# Patient Record
Sex: MALE | Race: WHITE | Employment: OTHER | URBAN - METROPOLITAN AREA
[De-identification: names, ages, dates, MRNs, and addresses within clinical notes are randomized per-mention and may not be internally consistent; named-entity substitution may affect disease eponyms.]

---

## 2023-01-22 ENCOUNTER — HOSPITAL ENCOUNTER (EMERGENCY)
Facility: CLINIC | Age: 73
Discharge: HOME | End: 2023-01-22
Attending: EMERGENCY MEDICINE
Payer: OTHER GOVERNMENT

## 2023-01-22 VITALS
OXYGEN SATURATION: 96 % | SYSTOLIC BLOOD PRESSURE: 172 MMHG | HEIGHT: 69 IN | WEIGHT: 230 LBS | TEMPERATURE: 97.6 F | BODY MASS INDEX: 34.07 KG/M2 | HEART RATE: 106 BPM | DIASTOLIC BLOOD PRESSURE: 88 MMHG | RESPIRATION RATE: 18 BRPM

## 2023-01-22 DIAGNOSIS — R33.9 URINARY RETENTION: ICD-10-CM

## 2023-01-22 DIAGNOSIS — R33.8 ACUTE URINARY RETENTION: Primary | ICD-10-CM

## 2023-01-22 LAB
ABSOLUTE EOS #: 0.2 K/UL (ref 0–0.4)
ABSOLUTE LYMPH #: 1.3 K/UL (ref 1–4.8)
ABSOLUTE MONO #: 0.6 K/UL (ref 0.1–1.2)
ANION GAP SERPL CALCULATED.3IONS-SCNC: 11 MMOL/L (ref 9–17)
BACTERIA: ABNORMAL
BASOPHILS # BLD: 1 % (ref 0–2)
BASOPHILS ABSOLUTE: 0 K/UL (ref 0–0.2)
BILIRUBIN URINE: NEGATIVE
BUN BLDV-MCNC: 22 MG/DL (ref 8–23)
CALCIUM SERPL-MCNC: 9.5 MG/DL (ref 8.6–10.4)
CHLORIDE BLD-SCNC: 99 MMOL/L (ref 98–107)
CO2: 28 MMOL/L (ref 20–31)
COLOR: YELLOW
CREAT SERPL-MCNC: 0.9 MG/DL (ref 0.7–1.2)
EOSINOPHILS RELATIVE PERCENT: 2 % (ref 1–4)
EPITHELIAL CELLS UA: ABNORMAL /HPF (ref 0–5)
GFR SERPL CREATININE-BSD FRML MDRD: >60 ML/MIN/1.73M2
GLUCOSE BLD-MCNC: 246 MG/DL (ref 70–99)
GLUCOSE URINE: ABNORMAL
HCT VFR BLD CALC: 49.8 % (ref 41–53)
HEMOGLOBIN: 16 G/DL (ref 13.5–17.5)
KETONES, URINE: NEGATIVE
LEUKOCYTE ESTERASE, URINE: NEGATIVE
LYMPHOCYTES # BLD: 18 % (ref 24–44)
MCH RBC QN AUTO: 26.5 PG (ref 26–34)
MCHC RBC AUTO-ENTMCNC: 32.1 G/DL (ref 31–37)
MCV RBC AUTO: 82.4 FL (ref 80–100)
MONOCYTES # BLD: 8 % (ref 2–11)
NITRITE, URINE: NEGATIVE
OTHER OBSERVATIONS UA: ABNORMAL
PDW BLD-RTO: 16.7 % (ref 12.5–15.4)
PH UA: 6 (ref 5–8)
PLATELET # BLD: 210 K/UL (ref 140–450)
PMV BLD AUTO: 7.4 FL (ref 6–12)
POTASSIUM SERPL-SCNC: 4.3 MMOL/L (ref 3.7–5.3)
PROTEIN UA: ABNORMAL
RBC # BLD: 6.04 M/UL (ref 4.5–5.9)
RBC UA: ABNORMAL /HPF (ref 0–2)
SEG NEUTROPHILS: 71 % (ref 36–66)
SEGMENTED NEUTROPHILS ABSOLUTE COUNT: 5 K/UL (ref 1.8–7.7)
SODIUM BLD-SCNC: 138 MMOL/L (ref 135–144)
SPECIFIC GRAVITY UA: 1.02 (ref 1–1.03)
TURBIDITY: ABNORMAL
URINE HGB: ABNORMAL
UROBILINOGEN, URINE: NORMAL
WBC # BLD: 7.1 K/UL (ref 3.5–11)
WBC UA: ABNORMAL /HPF (ref 0–5)

## 2023-01-22 PROCEDURE — 99283 EMERGENCY DEPT VISIT LOW MDM: CPT

## 2023-01-22 PROCEDURE — 81001 URINALYSIS AUTO W/SCOPE: CPT

## 2023-01-22 PROCEDURE — 85025 COMPLETE CBC W/AUTO DIFF WBC: CPT

## 2023-01-22 PROCEDURE — 80048 BASIC METABOLIC PNL TOTAL CA: CPT

## 2023-01-22 PROCEDURE — 36415 COLL VENOUS BLD VENIPUNCTURE: CPT

## 2023-01-22 PROCEDURE — 6370000000 HC RX 637 (ALT 250 FOR IP): Performed by: NURSE PRACTITIONER

## 2023-01-22 RX ORDER — LIDOCAINE HYDROCHLORIDE 20 MG/ML
JELLY TOPICAL ONCE
Status: COMPLETED | OUTPATIENT
Start: 2023-01-22 | End: 2023-01-22

## 2023-01-22 RX ADMIN — LIDOCAINE HYDROCHLORIDE: 20 JELLY TOPICAL at 21:19

## 2023-01-22 ASSESSMENT — PAIN DESCRIPTION - ONSET: ONSET: ON-GOING

## 2023-01-22 ASSESSMENT — ENCOUNTER SYMPTOMS
ABDOMINAL DISTENTION: 1
ABDOMINAL PAIN: 1

## 2023-01-22 ASSESSMENT — PAIN DESCRIPTION - FREQUENCY: FREQUENCY: CONTINUOUS

## 2023-01-22 ASSESSMENT — PAIN DESCRIPTION - DESCRIPTORS: DESCRIPTORS: ACHING

## 2023-01-22 ASSESSMENT — PAIN DESCRIPTION - PAIN TYPE: TYPE: ACUTE PAIN

## 2023-01-22 ASSESSMENT — PAIN SCALES - GENERAL: PAINLEVEL_OUTOF10: 8

## 2023-01-22 ASSESSMENT — PAIN - FUNCTIONAL ASSESSMENT
PAIN_FUNCTIONAL_ASSESSMENT: 0-10
PAIN_FUNCTIONAL_ASSESSMENT: ACTIVITIES ARE NOT PREVENTED

## 2023-01-22 ASSESSMENT — PAIN DESCRIPTION - LOCATION: LOCATION: ABDOMEN

## 2023-01-23 ENCOUNTER — HOSPITAL ENCOUNTER (EMERGENCY)
Facility: CLINIC | Age: 73
Discharge: HOME | End: 2023-01-23
Attending: EMERGENCY MEDICINE
Payer: OTHER GOVERNMENT

## 2023-01-23 VITALS
TEMPERATURE: 97.8 F | HEART RATE: 99 BPM | BODY MASS INDEX: 34.07 KG/M2 | DIASTOLIC BLOOD PRESSURE: 121 MMHG | RESPIRATION RATE: 16 BRPM | WEIGHT: 230 LBS | SYSTOLIC BLOOD PRESSURE: 142 MMHG | HEIGHT: 69 IN | OXYGEN SATURATION: 91 %

## 2023-01-23 DIAGNOSIS — T83.9XXA PROBLEM WITH FOLEY CATHETER, INITIAL ENCOUNTER (HCC): Primary | ICD-10-CM

## 2023-01-23 PROCEDURE — 51702 INSERT TEMP BLADDER CATH: CPT

## 2023-01-23 PROCEDURE — 99282 EMERGENCY DEPT VISIT SF MDM: CPT

## 2023-01-23 ASSESSMENT — PAIN - FUNCTIONAL ASSESSMENT: PAIN_FUNCTIONAL_ASSESSMENT: NONE - DENIES PAIN

## 2023-01-23 NOTE — ED NOTES
Patient placed on 4L of [place] O2 which he wears at home  O2 sat of [facility]  01/22/23 2103

## 2023-01-23 NOTE — DISCHARGE INSTRUCTIONS
Continue to monitor Franks output. See your urologist as scheduled. Return for pain, fever, blood in urine, decreased drainage, or if worse in any way. Please understand that at this time there is no evidence for a more serious underlying process, but that early in the process of an illness or injury, an emergency department workup can be falsely reassuring. You should contact your family doctor within the next 48 hours for a follow up appointment    Melaniekrisduglas Saunders!!!    From [facility] ([facility]) and [facility] Emergency Services    On behalf of the Emergency Department staff at [facility]), I would like to thank you for giving us the opportunity to address your health care needs and concerns. We hope that during your visit, our service was delivered in a professional and caring manner. Please keep [facility] ([facility]) in mind as we walk with you down the path to your own personal wellness. Please expect an automated text message or email from us so we can ask a few questions about your health and progress. Based on your answers, a clinician may call you back to offer help and instructions. Please understand that early in the process of an illness or injury, an emergency department workup can be falsely reassuring. If you notice any worsening, changing or persistent symptoms please call your family doctor or return to the ER immediately. Tell us how we did during your visit at [email or web address]   and let us know about your experience

## 2023-01-23 NOTE — ED PROVIDER NOTES
[address] ED  EMERGENCY DEPARTMENT ENCOUNTER      Pt Name: Shena Enrique  MRN: [id number]  [place] 1950  Date of evaluation: 1/22/2023  Provider: GARETT Rodriguez [id number]       Chief Complaint   Patient presents with    Urinary Retention    Penis Pain         HISTORY OF PRESENT ILLNESS   (Location/Symptom, Timing/Onset, Context/Setting, Quality, Duration, Modifying Factors, Severity)  Note limiting factors. Shena Enrique is a 81 y.o. male who presents to the emergency department evaluation of urinary retention. Patient states that yesterday he seemed to be urinating fine. This morning he woke up and has only been dribbling. He reports significant discomfort in his abdominal region and has burning in the tip of the penis. Patient denies any history of kidney stones denies any hematuria or blood in the urine but reports that it is only been able to void small amounts since this morning. He has been out of his \"prostate medication\" for about a week. Patient has a history of prostate cancer about 15 years ago. He states that his prostate was not removed it was just \"scraped\" he sees a urologist at the [place] but has not for some time. He denies any chest pain or new shortness of breath. Patient has COPD and is on chronic oxygen therapy he continues to smoke. Patient denies any fevers chills nausea vomiting. He denies any flank pain        Nursing Notes were reviewed. REVIEW OF SYSTEMS    (2-9 systems for level 4, 10 or more for level 5)     Review of Systems   Gastrointestinal:  Positive for abdominal distention and abdominal pain. Genitourinary:  Positive for decreased urine volume and penile pain. All other systems reviewed and are negative. Except as noted above the remainder of the review of systems was reviewed and negative.        PAST MEDICAL HISTORY     Past Medical History:   Diagnosis Date    Arthritis     Cerebral artery occlusion with cerebral infarction [facility])     CHF (congestive heart failure) (HCC)     COPD (chronic obstructive pulmonary disease) (HCC)     Diabetes mellitus (HCC)     Hyperlipidemia     Seizures (HCC)     Sleep apnea          SURGICAL HISTORY     History reviewed. No pertinent surgical history. CURRENT MEDICATIONS       Discharge Medication List as of 1/22/2023 10:27 PM        CONTINUE these medications which have NOT CHANGED    Details   predniSONE (DELTASONE) 10 MG tablet Take 4 tablets for 4 days, then Take 3 tablets for 4 days, then Take 2 tablets for 4 days, then Take 1 tablet for 4 days, then stop, Disp-40 tablet, R-0      metFORMIN (GLUCOPHAGE) 500 MG tablet Take 1,000 mg by mouth 2 times daily (with meals)       aspirin 325 MG tablet Take 325 mg by mouth daily      albuterol-ipratropium (COMBIVENT)  MCG/ACT inhaler Inhale 1 puff into the lungs 4 times daily      atorvastatin (LIPITOR) 40 MG tablet Take 40 mg by mouth daily      furosemide (LASIX) 40 MG tablet Take 40 mg by mouth 2 times daily      glyBURIDE (DIABETA) 5 MG tablet Take 5 mg by mouth 2 times daily (with meals)      levETIRAcetam (KEPPRA) 750 MG tablet Take 750 mg by mouth 2 times daily      lisinopril (PRINIVIL;ZESTRIL) 40 MG tablet Take 40 mg by mouth daily             ALLERGIES     Patient has no known allergies. FAMILY HISTORY     History reviewed. No pertinent family history.        SOCIAL HISTORY       Social History     Socioeconomic History    Marital status:      Spouse name: None    Number of children: None    Years of education: None    Highest education level: None   Tobacco Use    Smoking status: Every Day   Substance and Sexual Activity    Alcohol use: No       SCREENINGS         [place] Coma Scale  Eye Opening: Spontaneous  Best Verbal Response: Oriented  Best Motor Response: Obeys commands  [place] Coma Scale Score: 15                     CIWA Assessment  BP: (!) 172/88  Heart Rate: (!) 106                 PHYSICAL EXAM    (up to 7 for level 4, 8 or more for level 5)     ED Triage Vitals [01/22/23 2055]   BP Temp Temp Source Heart Rate Resp SpO2 Height Weight   (!) 172/88 97.6 °F (36.4 °C) Oral (!) 106 16 90 % 5' 9\" (1.753 m) 230 lb (104.3 kg)       Physical Exam  Vitals and nursing note reviewed. Constitutional:       General: He is not in acute distress. Appearance: Normal appearance. He is obese. He is not ill-appearing or toxic-appearing. HENT:      Head: Normocephalic and atraumatic. Right Ear: External ear normal.      Left Ear: External ear normal.      Ears:      Comments: Patient is significantly hard of hearing     Nose: Nose normal. No congestion. Mouth/Throat:      Mouth: Mucous membranes are moist.      Pharynx: Oropharynx is clear. Eyes:      General:         Right eye: No discharge. Left eye: No discharge. Extraocular Movements: Extraocular movements intact. Conjunctiva/sclera: Conjunctivae normal.   Cardiovascular:      Rate and Rhythm: Normal rate and regular rhythm. Pulses: Normal pulses. Heart sounds: Normal heart sounds. Pulmonary:      Effort: Pulmonary effort is normal. No respiratory distress. Comments: Diminished breath sounds throughout with scattered expiratory wheeze  Chest:      Chest wall: No tenderness. Abdominal:      General: There is distension. Tenderness: There is abdominal tenderness. There is no right CVA tenderness, left CVA tenderness, guarding or rebound. Comments: The level of the bladder is palpable near the umbilicus. He has significant distention and pain when I touch here. Genitourinary:     Penis: Normal.       Testes: Normal.   Musculoskeletal:         General: Normal range of motion. Cervical back: Normal range of motion and neck supple. No rigidity or tenderness. Skin:     General: Skin is warm and dry. Capillary Refill: Capillary refill takes less than 2 seconds.    Neurological:      General: No focal deficit present. Mental Status: He is alert and oriented to person, place, and time. Psychiatric:         Mood and Affect: Mood normal.         Behavior: Behavior normal.       DIAGNOSTIC RESULTS     EKG: All EKG's are interpreted by the Emergency Department Physician who either signs or Co-signs this chart in the absence of a cardiologist.        RADIOLOGY:   Non-plain film images such as CT, Ultrasound and MRI are read by the radiologist. Plain radiographic images are visualized and preliminarily interpreted by the emergency physician with the below findings:        Interpretation per the Radiologist below, if available at the time of this note:    No orders to display         ED BEDSIDE ULTRASOUND:   Performed by ED Physician - none    LABS:  Labs Reviewed   CBC WITH AUTO DIFFERENTIAL - Abnormal; Notable for the following components:       Result Value    RBC 6.04 (*)     RDW 16.7 (*)     Seg Neutrophils 71 (*)     Lymphocytes 18 (*)     All other components within normal limits   BASIC METABOLIC PANEL - Abnormal; Notable for the following components:    Glucose 246 (*)     All other components within normal limits   URINALYSIS WITH REFLEX TO CULTURE - Abnormal; Notable for the following components:    Turbidity UA SLIGHTLY CLOUDY (*)     Glucose, Ur 2+ (*)     Urine Hgb LARGE (*)     Protein, UA 2+ (*)     All other components within normal limits   MICROSCOPIC URINALYSIS - Abnormal; Notable for the following components:    Other Observations UA   (*)     Value: Utilizing a urinalysis as the only screening method to exclude a potential uropathogen can be unreliable in many patient populations. Rapid screening tests are less sensitive than culture and if UTI is a clinical possibility, culture should be considered despite a negative urinalysis. All other components within normal limits       All other labs were within normal range or not returned as of this dictation.     EMERGENCY DEPARTMENT COURSE and DIFFERENTIAL DIAGNOSIS/MDM:   Vitals:    Vitals:    01/22/23 2055 01/22/23 2103   BP: (!) 172/88    Pulse: (!) 106    Resp: 16 18   Temp: 97.6 °F (36.4 °C)    TempSrc: Oral    SpO2: 90% 96%   Weight: 104.3 kg (230 lb)    Height: 5' 9\" (1.753 m)            Medical Decision Making  Amount and/or Complexity of Data Reviewed  Labs: ordered. Risk  Prescription drug management. At 2130, the patient is signed out to my attending physician Dr. Robert Claros pending lab results, urinalysis, coburn insertion, and any further treatment plan deemed necessary including final diagnosis and treatment. REASSESSMENT          CRITICAL CARE TIME       CONSULTS:  None    PROCEDURES:  Unless otherwise noted below, none     Procedures        FINAL IMPRESSION      1. Acute urinary retention    2. Urinary retention          DISPOSITION/PLAN   DISPOSITION Decision To Discharge 01/22/2023 10:26:25 PM      PATIENT REFERRED TO:    Follow-up with your doctor or urologist within 1 to 2 days. DISCHARGE MEDICATIONS:  Discharge Medication List as of 1/22/2023 10:27 PM        Controlled Substances Monitoring:     No flowsheet data found.     (Please note that portions of this note were completed with a voice recognition program.  Efforts were made to edit the dictations but occasionally words are mis-transcribed.)    GARETT Foster CNP (electronically signed)  Attending Emergency Physician           GARETT Foster CNP  01/22/23 2123       GARETT Foster CNP  01/23/23 5782

## 2023-01-23 NOTE — ED NOTES
Franks placed  Urine collected, labeled, and sent to lab  1L of urine drained from bladder  Patient tolerated well and states a relief of symptoms     Roland Canales RN  01/22/23 5614

## 2023-01-23 NOTE — ED NOTES
Patient to ED via self and family to room 12  Here for complaint of urinary retention, bladder distention, and penile and testicular pain  Patient states his symptoms started today and he felt fine yesterday  Patient states that approximately 15 years ago he did have prostate cancer that was taken care of with a TURP and he has been fine since  Patient states he is now experiencing discomfort and is unable to completely empty his bladder as he only gets drips of urine out  Denies CP, SOB, or N/V    Vitals obtained and call light provided  Patient resting comfortably on stretcher in no apparent distress  Respirations even and non-labored  Provider at bedside for evaluation     Lore Plasencia RN  01/22/23 3466

## 2023-01-23 NOTE — DISCHARGE INSTRUCTIONS
We have changed her catheter there should not be any problem with you having good running catheter and being able to void. You may take Tylenol or ibuprofen for any discomfort that you are having make sure you are drinking plenty of water. Follow-up with your doctor and return to emergency setting if you are worsening anyway if your catheter stops working.

## 2023-01-23 NOTE — ED PROVIDER NOTES
[address] [facility]      Pt Name: Adela Guallpa  MRN: [id number]  [place] 1950  Date of evaluation: 1/23/2023      CHIEF COMPLAINT       Chief Complaint   Patient presents with    Other     Franks disconnected from collection bag         HISTORY OF PRESENT ILLNESS      The patient presents with problems from his Franks catheter. The patient had a Franks catheter placed yesterday for urinary retention. Today, the Franks catheter bag became disconnected from the tubing. He is draining urine around onto his clothes. The patient has had problems with the leg bag as its been pulling on the catheter. Otherwise, he has not had any bleeding in the Franks or other issues. He denies fever. He is planning to see the [facility] urologist.      REVIEW OF SYSTEMS       All systems reviewed and negative unless noted in HPI. The patient denies fever or constitutional symptoms. Denies chest pain or shortness of breath. No nausea,  vomiting or diarrhea. Recent urinary retention. Problem with Franks bag today. Denies musculoskeletal injury or pain. Denies any weakness, numbness or focal neurologic deficit. Denies any skin rash or edema. No recent psychiatric issues. No easy bruising or bleeding. History of diabetes. PAST MEDICAL HISTORY    has a past medical history of Arthritis, Cerebral artery occlusion with cerebral infarction ([facility] [address] CHF (congestive heart failure) ([facility] [address] COPD (chronic obstructive pulmonary disease) ([facility] [address] Diabetes mellitus ([facility] [address] Hyperlipidemia, Seizures ([facility] [address] and Sleep apnea. SURGICAL HISTORY      has no past surgical history on file.     CURRENT MEDICATIONS       Previous Medications    ALBUTEROL-IPRATROPIUM (COMBIVENT)  MCG/ACT INHALER    Inhale 1 puff into the lungs 4 times daily    ASPIRIN 325 MG TABLET    Take 325 mg by mouth daily    ATORVASTATIN (LIPITOR) 40 MG TABLET    Take 40 mg by mouth daily    FUROSEMIDE (LASIX) 40 MG TABLET    Take 40 mg by mouth 2 times daily    GLYBURIDE (DIABETA) 5 MG TABLET    Take 5 mg by mouth 2 times daily (with meals)    LEVETIRACETAM (KEPPRA) 750 MG TABLET    Take 750 mg by mouth 2 times daily    LISINOPRIL (PRINIVIL;ZESTRIL) 40 MG TABLET    Take 40 mg by mouth daily    METFORMIN (GLUCOPHAGE) 500 MG TABLET    Take 1,000 mg by mouth 2 times daily (with meals)     PREDNISONE (DELTASONE) 10 MG TABLET    Take 4 tablets for 4 days, then Take 3 tablets for 4 days, then Take 2 tablets for 4 days, then Take 1 tablet for 4 days, then stop       ALLERGIES     has No Known Allergies. FAMILY HISTORY     has no family status information on file. family history is not on file. SOCIAL HISTORY      reports that he has been smoking. He does not have any smokeless tobacco history on file. He reports that he does not drink alcohol. PHYSICAL EXAM     INITIAL VITALS:  height is 5' 9\" (1.753 m) and weight is 104.3 kg (230 lb). His oral temperature is 97.8 °F (36.6 °C). His pulse is 99. His respiration is 16 and oxygen saturation is 91%. The patient is alert and oriented, in no apparent distress. HEENT is atraumatic. Pupils are PERRL at 4 mm. Mucous membranes moist.    Neck is supple  Heart sounds regular rate and rhythm. Lungs: Sounds bilaterally. Abdomen: soft, nontender with no pain to palpation. : Catheter in place. No bleeding or erosion around the meatus. Franks catheter has pulled free from the bag. This was easily replaced and the leg bag repositioned. Musculoskeletal exam shows no evidence of trauma. Normal distal pulses in all extremities. Skin: no rash or edema. Neurological exam reveals cranial nerves 2 through 12 grossly intact. Patient has equal  and normal deep tendon reflexes. DIFFERENTIAL DIAGNOSIS/ MDM:     Differential diagnosis considered:  Rfanks problem, penile pain, UTI    Chronic Conditions affecting care (DM,HTN,CA, etc): Diabetes    Social Determinants of Health affecting care (unable to care for self, lives alone, unemployed, homeless,etc): None      History source(s) (patient,spouse,parent,family,friend,EMS,etc): Patient and family    Review of external sources (ECF,Hospital records,EMS report, radiology reports, etc): Previous hospital records      Tests considered but not ordered: None      Independent interpretation of tests (eg.  X-ray, CAT scan, Doppler studies, EKG): None      Discussion of x-ray results with radiology: Not applicable      Consults: None      Consideration for admission/observation (even if discharged): Not applicable      Prescription considerations: None      Sepsis considered: Not applicable      Critical Care note written: Not applicable        DIAGNOSTIC RESULTS           EMERGENCY DEPARTMENT COURSE:   Vitals:    Vitals:    01/23/23 0843   Pulse: 99   Resp: 16   Temp: 97.8 °F (36.6 °C)   TempSrc: Oral   SpO2: 91%   Weight: 104.3 kg (230 lb)   Height: 5' 9\" (1.753 m)     -------------------------   , Temp: 97.8 °F (36.6 °C), Heart Rate: 99, Resp: 16      Re-evaluation Notes    I do not see the need for urinary testing. The patient's problem was simply with connecting the Franks bag back to the tubing. He is discharged in good condition. He will be following up with his urologist.    PROCEDURES:    Franks catheter and tubing were reattached and the leg bag reposition. The patient was draining yellow urine. FINAL IMPRESSION      1.  Problem with Franks catheter, initial encounter [facility])          DISPOSITION/PLAN   DISPOSITION Decision To Discharge 01/23/2023 08:43:31 AM      Condition on Disposition    good    PATIENT REFERRED TO:  your urologist    In 1 week        DISCHARGE MEDICATIONS:  New Prescriptions    No medications on file       (Please note that portions of this note were completed with a voice recognition program.  Efforts were made to edit the dictations but occasionally words are mis-transcribed.)    Juan Manuel German Enrique Sarmiento MD,, MD   Attending Emergency Physician       Rafa Taylor MD  01/23/23 7941

## 2023-01-23 NOTE — ED PROVIDER NOTES
[facility] ED  [address] [facility]  Phone: [phone]      Attending Physician Attestation    I performed a history and physical examination of the patient and discussed management with the mid level provideer. I reviewed the mid level provider's note and agree with the documented findings and plan of care. Any areas of disagreement are noted on the chart. I was personally present for the key portions of any procedures. I have documented in the chart those procedures where I was not present during the key portions. I have reviewed the emergency nurses triage note. I agree with the chief complaint, past medical history, past surgical history, allergies, medications, social and family history as documented unless otherwise noted below. Documentation of the HPI, Physical Exam and Medical Decision Making performed by mid level providers is based on my personal performance of the HPI, PE and MDM. For Physician Assistant/ Nurse Practitioner cases/documentation I have personally evaluated this patient and have completed at least one if not all key elements of the E/M (history, physical exam, and MDM). Additional findings are as noted. CHIEF COMPLAINT       Chief Complaint   Patient presents with    Urinary Retention    Penis Pain         HISTORY OF PRESENT ILLNESS    Jen Call is a 81 y.o. male who presents emergency department for evaluation of cute onset of urinary retention. Patient says yesterday he was able to urinate okay but this morning woke up and is only been dribbling small amounts. Reports abdominal distention and and burning at the tip of his penis. PAST MEDICAL HISTORY    has a past medical history of Arthritis, Cerebral artery occlusion with cerebral infarction ([address] CHF (congestive heart failure) ([address] COPD (chronic obstructive pulmonary disease) ([address] Diabetes mellitus ([address] Hyperlipidemia, Seizures ([address] and Sleep apnea.     SURGICAL HISTORY      has no past surgical history on file. CURRENT MEDICATIONS       Previous Medications    ALBUTEROL-IPRATROPIUM (COMBIVENT)  MCG/ACT INHALER    Inhale 1 puff into the lungs 4 times daily    ASPIRIN 325 MG TABLET    Take 325 mg by mouth daily    ATORVASTATIN (LIPITOR) 40 MG TABLET    Take 40 mg by mouth daily    FUROSEMIDE (LASIX) 40 MG TABLET    Take 40 mg by mouth 2 times daily    GLYBURIDE (DIABETA) 5 MG TABLET    Take 5 mg by mouth 2 times daily (with meals)    LEVETIRACETAM (KEPPRA) 750 MG TABLET    Take 750 mg by mouth 2 times daily    LISINOPRIL (PRINIVIL;ZESTRIL) 40 MG TABLET    Take 40 mg by mouth daily    METFORMIN (GLUCOPHAGE) 500 MG TABLET    Take 1,000 mg by mouth 2 times daily (with meals)     PREDNISONE (DELTASONE) 10 MG TABLET    Take 4 tablets for 4 days, then Take 3 tablets for 4 days, then Take 2 tablets for 4 days, then Take 1 tablet for 4 days, then stop       ALLERGIES     has No Known Allergies. FAMILY HISTORY     has no family status information on file. family history is not on file. SOCIAL HISTORY      reports that he has been smoking. He does not have any smokeless tobacco history on file. He reports that he does not drink alcohol. PHYSICAL EXAM     INITIAL VITALS:  height is 5' 9\" (1.753 m) and weight is 104.3 kg (230 lb). His oral temperature is 97.6 °F (36.4 °C). His blood pressure is 172/88 (abnormal) and his pulse is 106 (abnormal). His respiration is 18 and oxygen saturation is 96%.       Constitutional: Alert, oriented x3, nontoxic, afebrile, answering questions appropriately, acting properly for age, in no acute distress  HEENT: Extraocular muscles intact, mucus membranes moist, TMs clear bilaterally, no posterior pharyngeal erythema or exudates, Pupils equal, round, reactive to light,   Neck: Trachea midline, Supple without lymphadenopathy, no posterior midline neck tenderness to palpation  Cardiovascular: Regular rhythm and rate no S3, S4, or murmurs  Respiratory: Clear to auscultation bilaterally no wheezes, rhonchi, rales, no respiratory distress  Gastrointestinal: Soft, nontender, nondistended, positive bowel sounds. No rebound, rigidity, or guarding.   Musculoskeletal: No extremity pain or swelling  Neurologic: Moving all 4 extremities without difficulty there are no gross focal neurologic deficits  Skin: Warm and dry        DIAGNOSTIC RESULTS     EKG: All EKG's are interpreted by the Emergency Department Physician who either signs or Co-signs this chart in the absence of a cardiologist.        RADIOLOGY:   Non-plain film images such as CT, Ultrasound and MRI are read by the radiologist. Plain radiographic images are visualized and the radiologist interpretations are reviewed as follows:         LABS:  Results for orders placed or performed during the hospital encounter of 01/22/23   CBC with Auto Differential   Result Value Ref Range    WBC 7.1 3.5 - 11.0 k/uL    RBC 6.04 (H) 4.5 - 5.9 m/uL    Hemoglobin 16.0 13.5 - 17.5 g/dL    Hematocrit 49.8 41 - 53 %    MCV 82.4 80 - 100 fL    MCH 26.5 26 - 34 pg    MCHC 32.1 31 - 37 g/dL    RDW 16.7 (H) 12.5 - 15.4 %    Platelets 027 136 - 206 k/uL    MPV 7.4 6.0 - 12.0 fL    Seg Neutrophils 71 (H) 36 - 66 %    Lymphocytes 18 (L) 24 - 44 %    Monocytes 8 2 - 11 %    Eosinophils % 2 1 - 4 %    Basophils 1 0 - 2 %    Segs Absolute 5.00 1.8 - 7.7 k/uL    Absolute Lymph # 1.30 1.0 - 4.8 k/uL    Absolute Mono # 0.60 0.1 - 1.2 k/uL    Absolute Eos # 0.20 0.0 - 0.4 k/uL    Basophils Absolute 0.00 0.0 - 0.2 k/uL   BMP   Result Value Ref Range    Glucose 246 (H) 70 - 99 mg/dL    BUN 22 8 - 23 mg/dL    Creatinine 0.90 0.70 - 1.20 mg/dL    Est, Glom Filt Rate >60 >60 mL/min/1.73m2    Calcium 9.5 8.6 - 10.4 mg/dL    Sodium 138 135 - 144 mmol/L    Potassium 4.3 3.7 - 5.3 mmol/L    Chloride 99 98 - 107 mmol/L    CO2 28 20 - 31 mmol/L    Anion Gap 11 9 - 17 mmol/L   Urinalysis with Reflex to Culture    Specimen: Urine   Result Value Ref Range    Color, UA Yellow Yellow    Turbidity UA SLIGHTLY CLOUDY (A) Clear    Glucose, Ur 2+ (A) NEGATIVE    Bilirubin Urine NEGATIVE NEGATIVE    Ketones, Urine NEGATIVE NEGATIVE    Specific Gravity, UA 1.020 1.005 - 1.030    Urine Hgb LARGE (A) NEGATIVE    pH, UA 6.0 5.0 - 8.0    Protein, UA 2+ (A) NEGATIVE    Urobilinogen, Urine Normal Normal    Nitrite, Urine NEGATIVE NEGATIVE    Leukocyte Esterase, Urine NEGATIVE NEGATIVE   Microscopic Urinalysis   Result Value Ref Range    WBC, UA 0 TO 2 0 - 5 /HPF    RBC, UA TOO NUMEROUS TO COUNT 0 - 2 /HPF    Epithelial Cells UA 2 TO 5 0 - 5 /HPF    Bacteria, UA None None    Other Observations UA (A) NOT REQ. Utilizing a urinalysis as the only screening method to exclude a potential uropathogen can be unreliable in many patient populations. Rapid screening tests are less sensitive than culture and if UTI is a clinical possibility, culture should be considered despite a negative urinalysis.              EMERGENCY DEPARTMENT COURSE:   Vitals:    Vitals:    01/22/23 2055 01/22/23 2103   BP: (!) 172/88    Pulse: (!) 106    Resp: 16 18   Temp: 97.6 °F (36.4 °C)    TempSrc: Oral    SpO2: 90% 96%   Weight: 104.3 kg (230 lb)    Height: 5' 9\" (1.753 m)      -------------------------  BP: (!) 172/88, Temp: 97.6 °F (36.4 °C), Heart Rate: (!) 106, Resp: 18            Differential diagnosis considered: Acute urinary retention, urinary tract infection, obstruction    Chronic Conditions affecting care (DM,HTN,CA, etc): BPH    Social Determinants of Health affecting care (unable to care for self, lives alone, unemployed, homeless,etc): Lives at home with family    History source(s) (patient,spouse,parent,family,friend,EMS,etc): Patient and family    Review of external sources ([facility],Hospital records,EMS report, radiology reports, etc): Hospital    Tests considered but not ordered: not applicaple    Independent interpretation of tests (eg.  X-ray, CAT scan, Doppler studies, EKG): see below    Discussion of x-ray results with radiology: see below    Consults: see below    Consideration for admission/observation (even if discharged): admission and or observation considered based on test results and patient status    Prescription considerations: see below    Sepsis considered: Considered, not considered in differential    Critical Care note written: see below    PERTINENT ATTENDING PHYSICIAN COMMENTS:    Patient will get his catheter changed we will also get a CBC and a basic metabolic panel and then reevaluate him.    (Please note that portions of this note were completed with a voice recognition program.  Efforts were made to edit the dictations but occasionally words are mis-transcribed.)    Eliseo Lazaro III, MD,, MD, F.A.C.E.P.  Attending Emergency Medicine Physician       Eliseo Lazaro III, MD  01/22/23 4193

## 2025-05-09 ENCOUNTER — HOSPITAL ENCOUNTER (OUTPATIENT)
Age: 75
Setting detail: SPECIMEN
Discharge: HOME | End: 2025-05-09

## 2025-05-09 LAB
ANION GAP SERPL CALCULATED.3IONS-SCNC: 9 MMOL/L (ref 9–16)
BUN SERPL-MCNC: 22 MG/DL (ref 8–23)
CALCIUM SERPL-MCNC: 9 MG/DL (ref 8.8–10.2)
CHLORIDE SERPL-SCNC: 92 MMOL/L (ref 98–107)
CO2 SERPL-SCNC: 32 MMOL/L (ref 20–31)
CREAT SERPL-MCNC: 0.8 MG/DL (ref 0.7–1.2)
ERYTHROCYTE [DISTWIDTH] IN BLOOD BY AUTOMATED COUNT: 15.2 % (ref 11.8–14.4)
GFR, ESTIMATED: >90 ML/MIN/1.73M2
GLUCOSE SERPL-MCNC: 169 MG/DL (ref 82–115)
HCT VFR BLD AUTO: 42.6 % (ref 40.7–50.3)
HGB BLD-MCNC: 13.2 G/DL (ref 13–17)
MAGNESIUM SERPL-MCNC: 2.1 MG/DL (ref 1.6–2.4)
MCH RBC QN AUTO: 25 PG (ref 25.2–33.5)
MCHC RBC AUTO-ENTMCNC: 31 G/DL (ref 28.4–34.8)
MCV RBC AUTO: 80.8 FL (ref 82.6–102.9)
NRBC BLD-RTO: 0 PER 100 WBC
PHOSPHATE SERPL-MCNC: 2.8 MG/DL (ref 2.5–4.5)
PLATELET # BLD AUTO: 272 K/UL (ref 138–453)
PMV BLD AUTO: 9.5 FL (ref 8.1–13.5)
POTASSIUM SERPL-SCNC: 4.2 MMOL/L (ref 3.7–5.3)
RBC # BLD AUTO: 5.27 M/UL (ref 4.21–5.77)
SODIUM SERPL-SCNC: 133 MMOL/L (ref 136–145)
WBC OTHER # BLD: 4.8 K/UL (ref 3.5–11.3)

## 2025-05-09 PROCEDURE — 83735 ASSAY OF MAGNESIUM: CPT

## 2025-05-09 PROCEDURE — 80048 BASIC METABOLIC PNL TOTAL CA: CPT

## 2025-05-09 PROCEDURE — 36415 COLL VENOUS BLD VENIPUNCTURE: CPT

## 2025-05-09 PROCEDURE — 84100 ASSAY OF PHOSPHORUS: CPT

## 2025-05-09 PROCEDURE — 85027 COMPLETE CBC AUTOMATED: CPT

## 2025-05-12 ENCOUNTER — HOSPITAL ENCOUNTER (OUTPATIENT)
Age: 75
Setting detail: SPECIMEN
Discharge: HOME | End: 2025-05-12

## 2025-05-12 LAB
ANION GAP SERPL CALCULATED.3IONS-SCNC: 14 MMOL/L (ref 9–16)
BILIRUB UR QL STRIP: NEGATIVE
BUN SERPL-MCNC: 20 MG/DL (ref 8–23)
CALCIUM SERPL-MCNC: 8.4 MG/DL (ref 8.8–10.2)
CHLORIDE SERPL-SCNC: 92 MMOL/L (ref 98–107)
CLARITY UR: ABNORMAL
CO2 SERPL-SCNC: 27 MMOL/L (ref 20–31)
COLOR UR: ABNORMAL
CREAT SERPL-MCNC: 1 MG/DL (ref 0.7–1.2)
EPI CELLS #/AREA URNS HPF: NORMAL /HPF (ref 0–5)
ERYTHROCYTE [DISTWIDTH] IN BLOOD BY AUTOMATED COUNT: 15.8 % (ref 11.8–14.4)
GFR, ESTIMATED: 80 ML/MIN/1.73M2
GLUCOSE SERPL-MCNC: 108 MG/DL (ref 82–115)
GLUCOSE UR STRIP-MCNC: ABNORMAL MG/DL
HCT VFR BLD AUTO: 39.5 % (ref 40.7–50.3)
HGB BLD-MCNC: 12 G/DL (ref 13–17)
HGB UR QL STRIP.AUTO: ABNORMAL
KETONES UR STRIP-MCNC: ABNORMAL MG/DL
LEUKOCYTE ESTERASE UR QL STRIP: ABNORMAL
MAGNESIUM SERPL-MCNC: 1.6 MG/DL (ref 1.6–2.4)
MCH RBC QN AUTO: 24.7 PG (ref 25.2–33.5)
MCHC RBC AUTO-ENTMCNC: 30.4 G/DL (ref 28.4–34.8)
MCV RBC AUTO: 81.3 FL (ref 82.6–102.9)
NITRITE UR QL STRIP: POSITIVE
NRBC BLD-RTO: 0 PER 100 WBC
PH UR STRIP: 7 [PH] (ref 5–8)
PLATELET # BLD AUTO: 256 K/UL (ref 138–453)
PMV BLD AUTO: 10 FL (ref 8.1–13.5)
POTASSIUM SERPL-SCNC: 3.4 MMOL/L (ref 3.7–5.3)
PROT UR STRIP-MCNC: ABNORMAL MG/DL
RBC # BLD AUTO: 4.86 M/UL (ref 4.21–5.77)
RBC #/AREA URNS HPF: NORMAL /HPF (ref 0–2)
SODIUM SERPL-SCNC: 133 MMOL/L (ref 136–145)
SP GR UR STRIP: 1.01 (ref 1–1.03)
UROBILINOGEN UR STRIP-ACNC: ABNORMAL EU/DL (ref 0–1)
WBC #/AREA URNS HPF: NORMAL /HPF (ref 0–5)
WBC OTHER # BLD: 14 K/UL (ref 3.5–11.3)

## 2025-05-12 PROCEDURE — 85027 COMPLETE CBC AUTOMATED: CPT

## 2025-05-12 PROCEDURE — 83735 ASSAY OF MAGNESIUM: CPT

## 2025-05-12 PROCEDURE — 80048 BASIC METABOLIC PNL TOTAL CA: CPT

## 2025-05-12 PROCEDURE — 87077 CULTURE AEROBIC IDENTIFY: CPT

## 2025-05-12 PROCEDURE — 36415 COLL VENOUS BLD VENIPUNCTURE: CPT

## 2025-05-12 PROCEDURE — 81001 URINALYSIS AUTO W/SCOPE: CPT

## 2025-05-12 PROCEDURE — 87086 URINE CULTURE/COLONY COUNT: CPT

## 2025-05-12 PROCEDURE — 87186 SC STD MICRODIL/AGAR DIL: CPT

## 2025-05-15 LAB
MICROORGANISM SPEC CULT: ABNORMAL
SERVICE CMNT-IMP: ABNORMAL
SPECIMEN DESCRIPTION: ABNORMAL

## 2025-05-23 ENCOUNTER — HOSPITAL ENCOUNTER (OUTPATIENT)
Age: 75
Setting detail: SPECIMEN
Discharge: HOME | End: 2025-05-23

## 2025-05-23 LAB
ANION GAP SERPL CALCULATED.3IONS-SCNC: 10 MMOL/L (ref 9–16)
BUN SERPL-MCNC: 26 MG/DL (ref 8–23)
CALCIUM SERPL-MCNC: 9 MG/DL (ref 8.8–10.2)
CHLORIDE SERPL-SCNC: 99 MMOL/L (ref 98–107)
CO2 SERPL-SCNC: 30 MMOL/L (ref 20–31)
CREAT SERPL-MCNC: 0.7 MG/DL (ref 0.7–1.2)
ERYTHROCYTE [DISTWIDTH] IN BLOOD BY AUTOMATED COUNT: 17.7 % (ref 11.8–14.4)
GFR, ESTIMATED: >90 ML/MIN/1.73M2
GLUCOSE SERPL-MCNC: 116 MG/DL (ref 82–115)
HCT VFR BLD AUTO: 42.1 % (ref 40.7–50.3)
HGB BLD-MCNC: 12.6 G/DL (ref 13–17)
MAGNESIUM SERPL-MCNC: 2.3 MG/DL (ref 1.6–2.4)
MCH RBC QN AUTO: 24.5 PG (ref 25.2–33.5)
MCHC RBC AUTO-ENTMCNC: 29.9 G/DL (ref 28.4–34.8)
MCV RBC AUTO: 81.9 FL (ref 82.6–102.9)
NRBC BLD-RTO: 0 PER 100 WBC
PHOSPHATE SERPL-MCNC: 2.7 MG/DL (ref 2.5–4.5)
PLATELET # BLD AUTO: 239 K/UL (ref 138–453)
PMV BLD AUTO: 11.6 FL (ref 8.1–13.5)
POTASSIUM SERPL-SCNC: 4.6 MMOL/L (ref 3.7–5.3)
RBC # BLD AUTO: 5.14 M/UL (ref 4.21–5.77)
SODIUM SERPL-SCNC: 139 MMOL/L (ref 136–145)
WBC OTHER # BLD: 4.6 K/UL (ref 3.5–11.3)

## 2025-05-23 PROCEDURE — 80048 BASIC METABOLIC PNL TOTAL CA: CPT

## 2025-05-23 PROCEDURE — 85027 COMPLETE CBC AUTOMATED: CPT

## 2025-05-23 PROCEDURE — 84100 ASSAY OF PHOSPHORUS: CPT

## 2025-05-23 PROCEDURE — 36415 COLL VENOUS BLD VENIPUNCTURE: CPT

## 2025-05-23 PROCEDURE — 83735 ASSAY OF MAGNESIUM: CPT

## 2025-06-03 ENCOUNTER — HOSPITAL ENCOUNTER (OUTPATIENT)
Age: 75
Setting detail: SPECIMEN
Discharge: HOME | End: 2025-06-03

## 2025-06-03 LAB
ANION GAP SERPL CALCULATED.3IONS-SCNC: 13 MMOL/L (ref 9–16)
BUN SERPL-MCNC: 17 MG/DL (ref 8–23)
CALCIUM SERPL-MCNC: 9.2 MG/DL (ref 8.8–10.2)
CHLORIDE SERPL-SCNC: 100 MMOL/L (ref 98–107)
CO2 SERPL-SCNC: 27 MMOL/L (ref 20–31)
CREAT SERPL-MCNC: 0.8 MG/DL (ref 0.7–1.2)
ERYTHROCYTE [DISTWIDTH] IN BLOOD BY AUTOMATED COUNT: 17.9 % (ref 11.8–14.4)
GFR, ESTIMATED: >90 ML/MIN/1.73M2
GLUCOSE SERPL-MCNC: 132 MG/DL (ref 82–115)
HCT VFR BLD AUTO: 41.4 % (ref 40.7–50.3)
HGB BLD-MCNC: 12.6 G/DL (ref 13–17)
MAGNESIUM SERPL-MCNC: 1.9 MG/DL (ref 1.6–2.4)
MCH RBC QN AUTO: 25.3 PG (ref 25.2–33.5)
MCHC RBC AUTO-ENTMCNC: 30.4 G/DL (ref 28.4–34.8)
MCV RBC AUTO: 83.1 FL (ref 82.6–102.9)
NRBC BLD-RTO: 0 PER 100 WBC
PLATELET # BLD AUTO: 222 K/UL (ref 138–453)
PMV BLD AUTO: 10.3 FL (ref 8.1–13.5)
POTASSIUM SERPL-SCNC: 3.9 MMOL/L (ref 3.7–5.3)
RBC # BLD AUTO: 4.98 M/UL (ref 4.21–5.77)
SODIUM SERPL-SCNC: 139 MMOL/L (ref 136–145)
WBC OTHER # BLD: 5 K/UL (ref 3.5–11.3)

## 2025-06-03 PROCEDURE — 83735 ASSAY OF MAGNESIUM: CPT

## 2025-06-03 PROCEDURE — 36415 COLL VENOUS BLD VENIPUNCTURE: CPT

## 2025-06-03 PROCEDURE — 80048 BASIC METABOLIC PNL TOTAL CA: CPT

## 2025-06-03 PROCEDURE — 85027 COMPLETE CBC AUTOMATED: CPT
